# Patient Record
Sex: MALE | Race: WHITE | HISPANIC OR LATINO | URBAN - METROPOLITAN AREA
[De-identification: names, ages, dates, MRNs, and addresses within clinical notes are randomized per-mention and may not be internally consistent; named-entity substitution may affect disease eponyms.]

---

## 2021-02-09 ENCOUNTER — OFFICE VISIT (OUTPATIENT)
Dept: URBAN - METROPOLITAN AREA CLINIC 35 | Facility: CLINIC | Age: 46
End: 2021-02-09

## 2021-02-09 NOTE — HPI-MIGRATED HPI
Interim investigations : Labs done on: -> 01/21/2021 during ER admission:  - CMP: Glu: 94 ; BUN: 17 ; Cr: 0.99 ; Na: 137; K: 3.9 ; Alb: 5.0 ; Tbili: 0.9 ; ALP: 49 ; ALT: 21 ; AST: 22 - CBC: WBC: 8.50 ; RBC: 5.77 ; Hgb: 16.8 (H) ; Hct: 49.2 (H) ; Plt: 203;   Initial consultation : Patient is here for -> bloating/constipation  Onset of symptoms: 1 month ago Associated sx: lower abdominal pain Medication tried:________ Current BM:_________ Tests/evaluations done previously:  *** Patient went to the ER at [facility] on 01/21/2021 and discharge on the same date due to abdominal pain.  CT Abdomen + Pelvis during admission: No accute finding in the abdomen or pelvis to explain patient's symptoms. Sigmoid colonic anastomosis without bowel obstruction. Colonic diverticulosis without evidence of diverticulitis.  Upon discharge, was prescribed with Pepcid 20mg BID + Zofran 4mg Q8H prn + Cyclobenzaprine 5mg TID + Ibuprofen 800mg TID   *** Sigmoidoscopy 2 years ago by at [facility] on 04/29/2019. Record showed diverticulitis with some scarring to the lateral pelvic sidewall. Histology showed segment of the colon with diverticulosis involving the entrire segment and fibrous serosal adhesion;

## 2021-02-09 NOTE — EXAM-MIGRATED EXAMINATIONS
GENERAL APPEARANCE: - normal, alert, well developed, well nourished;   HEAD: - normocephalic, atraumatic;   NECK/THYROID: - neck supple, full range of motion, no cervical lymphadenopathy, no lymphadenopathy, no thyroid nodules, , thyroid normal, trachea midline;   NEUROLOGIC: - cranial nerves 2-12 grossly intact;   PSYCH: - cooperative with exam, mood/affect full range;